# Patient Record
Sex: FEMALE | Race: WHITE | NOT HISPANIC OR LATINO | URBAN - METROPOLITAN AREA
[De-identification: names, ages, dates, MRNs, and addresses within clinical notes are randomized per-mention and may not be internally consistent; named-entity substitution may affect disease eponyms.]

---

## 2017-04-16 ENCOUNTER — EMERGENCY (EMERGENCY)
Facility: HOSPITAL | Age: 37
LOS: 1 days | Discharge: HOME | End: 2017-04-16
Attending: EMERGENCY MEDICINE | Admitting: EMERGENCY MEDICINE
Payer: COMMERCIAL

## 2017-04-16 VITALS
WEIGHT: 176.37 LBS | RESPIRATION RATE: 18 BRPM | OXYGEN SATURATION: 97 % | HEIGHT: 65 IN | DIASTOLIC BLOOD PRESSURE: 72 MMHG | SYSTOLIC BLOOD PRESSURE: 113 MMHG | HEART RATE: 84 BPM | TEMPERATURE: 98 F

## 2017-04-16 DIAGNOSIS — R19.7 DIARRHEA, UNSPECIFIED: ICD-10-CM

## 2017-04-16 DIAGNOSIS — Z88.8 ALLERGY STATUS TO OTHER DRUGS, MEDICAMENTS AND BIOLOGICAL SUBSTANCES STATUS: ICD-10-CM

## 2017-04-16 PROCEDURE — 99284 EMERGENCY DEPT VISIT MOD MDM: CPT | Mod: 25

## 2017-04-16 RX ORDER — SODIUM CHLORIDE 9 MG/ML
2000 INJECTION INTRAMUSCULAR; INTRAVENOUS; SUBCUTANEOUS ONCE
Qty: 0 | Refills: 0 | Status: COMPLETED | OUTPATIENT
Start: 2017-04-16 | End: 2017-04-16

## 2017-04-16 RX ORDER — FAMOTIDINE 10 MG/ML
20 INJECTION INTRAVENOUS ONCE
Qty: 0 | Refills: 0 | Status: COMPLETED | OUTPATIENT
Start: 2017-04-16 | End: 2017-04-16

## 2017-04-16 NOTE — ED ADULT NURSE NOTE - OBJECTIVE STATEMENT
36 yr old female presents to the ed with c.o "multiple episodes of bloody diarrhea". states she has "brown stool with red strings". md tubbs at bedside and aware. denies any pain, nausea or vomiting. no distress noted. will continue to monitor.

## 2017-04-17 VITALS
RESPIRATION RATE: 18 BRPM | OXYGEN SATURATION: 98 % | SYSTOLIC BLOOD PRESSURE: 117 MMHG | DIASTOLIC BLOOD PRESSURE: 84 MMHG | TEMPERATURE: 98 F | HEART RATE: 80 BPM

## 2017-04-17 PROCEDURE — 85025 COMPLETE CBC W/AUTO DIFF WBC: CPT

## 2017-04-17 PROCEDURE — 86901 BLOOD TYPING SEROLOGIC RH(D): CPT

## 2017-04-17 PROCEDURE — 85730 THROMBOPLASTIN TIME PARTIAL: CPT

## 2017-04-17 PROCEDURE — 80053 COMPREHEN METABOLIC PANEL: CPT

## 2017-04-17 PROCEDURE — 96374 THER/PROPH/DIAG INJ IV PUSH: CPT

## 2017-04-17 PROCEDURE — 86850 RBC ANTIBODY SCREEN: CPT

## 2017-04-17 PROCEDURE — 99284 EMERGENCY DEPT VISIT MOD MDM: CPT | Mod: 25

## 2017-04-17 PROCEDURE — 86900 BLOOD TYPING SEROLOGIC ABO: CPT

## 2017-04-17 PROCEDURE — 85610 PROTHROMBIN TIME: CPT

## 2017-04-17 PROCEDURE — 84702 CHORIONIC GONADOTROPIN TEST: CPT

## 2017-04-17 PROCEDURE — 83605 ASSAY OF LACTIC ACID: CPT

## 2017-04-17 PROCEDURE — 36415 COLL VENOUS BLD VENIPUNCTURE: CPT

## 2017-04-17 RX ORDER — AZITHROMYCIN 500 MG/1
1 TABLET, FILM COATED ORAL
Qty: 3 | Refills: 0 | OUTPATIENT
Start: 2017-04-17 | End: 2017-04-20

## 2017-04-17 RX ORDER — AZITHROMYCIN 500 MG/1
500 TABLET, FILM COATED ORAL ONCE
Qty: 0 | Refills: 0 | Status: COMPLETED | OUTPATIENT
Start: 2017-04-17 | End: 2017-04-17

## 2017-04-17 RX ADMIN — AZITHROMYCIN 500 MILLIGRAM(S): 500 TABLET, FILM COATED ORAL at 02:00

## 2017-04-17 RX ADMIN — SODIUM CHLORIDE 2000 MILLILITER(S): 9 INJECTION INTRAMUSCULAR; INTRAVENOUS; SUBCUTANEOUS at 00:02

## 2017-04-17 RX ADMIN — FAMOTIDINE 20 MILLIGRAM(S): 10 INJECTION INTRAVENOUS at 00:02

## 2017-04-17 NOTE — ED PROVIDER NOTE - MEDICAL DECISION MAKING DETAILS
36F with no other past medical hx presenting with diarrhea x 6-8 episodes, 2 episodes bloody diarrhea. Rectal exam wnl, vital signs wnl, abd remains nontender. Doubt EHEC, no risk factors, no consumption of under cooked meat, friends do not have similar sx. Doubt C.Diff, very mild elevation in wbc count only, nontender abd, overall well appearing without fever. Abx use was tetracycline, minimal use (2 days only) pt has used it before without complication. Discussed at length re: no features of invasive bacterial infection, doubt need for abx. Doubt EHEC given no sick contacts of friends who ate similar foods, of fever. Doubt c. diff, low risk with two doses of minocycline which pt has had before, no significantly elevated wbc count, nontender abd. No indication for emergent abx, will give azithro rx; instructed pt to fill only if diarrhea is persistent for more than a day, given strict return precautions for abd pain/fever/persistent bloody diarrhea. No indication for imaging at this time with nontender abd, doubt invasive bacterial infection, doubt severe colitis, nontender abd only. Likely diarrhea due to viral cause. Pt reports improvement after IV fluids, Pepcid. 36F with no other past medical hx presenting with diarrhea x 6-8 episodes, 2 episodes bloody diarrhea. Rectal exam wnl, vital signs wnl, abd remains nontender. Doubt EHEC, no risk factors, no consumption of under cooked meat, friends do not have similar sx. Doubt C.Diff, very mild elevation in wbc count only, nontender abd, overall well appearing without fever. Abx use was tetracycline, minimal use (2 days only) pt has used it before without complication. Discussed at length re: no features of invasive bacterial infection, doubt need for abx. Doubt EHEC given no sick contacts of friends who ate similar foods, of fever. Doubt c. diff, low risk with two doses of minocycline which pt has had before, no significantly elevated wbc count, nontender abd. will give azithro for blood/mucous reported in stool.  given strict return precautions for abd pain/fever/persistent bloody diarrhea. No indication for imaging at this time with nontender abd, however given reported bloody diarrhea, will give abx azithromycin. . Pt reports improvement after IV fluids, Pepcid.

## 2017-04-17 NOTE — ED PROVIDER NOTE - OBJECTIVE STATEMENT
36F with no past medical hx no surgeries presenting with diarrhea x 1 day, 6 episodes of brownish loose stool. No sick contacts, pt had meal with friends but no one else with similar sx, no travel hx. Took two doses of minocycline 2 days ago. Denies abd pain, abd cramping only relieved with bowel movement. Pt reports blood in loose stool x 2 episodes today prior to coming to ER. No n/v/fever. No consumption of under cooked meat.

## 2017-04-17 NOTE — ED PROVIDER NOTE - PROGRESS NOTE DETAILS
On repeat abd exam, pt nontender. No further episodes of loose stool in ER. On repeat abd exam, pt nontender. Had 1 episode of loose stool in ER, ? mucoid with min blood. Will give abx.

## 2025-02-24 NOTE — ED PROVIDER NOTE - NS ED MD DISPO DISCHARGE CCDA
Spoke with patient, informed her that I have received her message. I advised pt that Dr Freitas does not have any available appointments at this current time but however, I can send a message to [facility] and advise staff to contact pt in regards to scheduling a Sleep appointment at there facility. Pt verbalized that she understand.   Patient/Caregiver provided printed discharge information.